# Patient Record
Sex: FEMALE | Race: WHITE | NOT HISPANIC OR LATINO | Employment: FULL TIME | URBAN - METROPOLITAN AREA
[De-identification: names, ages, dates, MRNs, and addresses within clinical notes are randomized per-mention and may not be internally consistent; named-entity substitution may affect disease eponyms.]

---

## 2017-02-21 ENCOUNTER — HOSPITAL ENCOUNTER (OUTPATIENT)
Dept: RADIOLOGY | Facility: HOSPITAL | Age: 42
Discharge: HOME | End: 2017-02-21
Attending: FAMILY MEDICINE
Payer: COMMERCIAL

## 2017-02-21 ENCOUNTER — OFFICE VISIT (OUTPATIENT)
Dept: ORTHOPEDICS | Facility: CLINIC | Age: 42
End: 2017-02-21
Payer: COMMERCIAL

## 2017-02-21 ENCOUNTER — OFFICE VISIT (OUTPATIENT)
Dept: FAMILY MEDICINE | Facility: CLINIC | Age: 42
End: 2017-02-21
Payer: COMMERCIAL

## 2017-02-21 VITALS
HEIGHT: 72 IN | SYSTOLIC BLOOD PRESSURE: 116 MMHG | BODY MASS INDEX: 30.22 KG/M2 | HEART RATE: 64 BPM | WEIGHT: 223.13 LBS | RESPIRATION RATE: 14 BRPM | TEMPERATURE: 98 F | DIASTOLIC BLOOD PRESSURE: 80 MMHG

## 2017-02-21 VITALS
DIASTOLIC BLOOD PRESSURE: 80 MMHG | HEART RATE: 84 BPM | WEIGHT: 223 LBS | SYSTOLIC BLOOD PRESSURE: 116 MMHG | HEIGHT: 72 IN | BODY MASS INDEX: 30.2 KG/M2

## 2017-02-21 DIAGNOSIS — S62.002A CLOSED DISPLACED FRACTURE OF SCAPHOID OF LEFT WRIST, UNSPECIFIED PORTION OF SCAPHOID, INITIAL ENCOUNTER: ICD-10-CM

## 2017-02-21 DIAGNOSIS — S69.92XA LEFT WRIST INJURY, INITIAL ENCOUNTER: Primary | ICD-10-CM

## 2017-02-21 DIAGNOSIS — M25.532 LEFT WRIST PAIN: ICD-10-CM

## 2017-02-21 DIAGNOSIS — M25.532 LEFT WRIST PAIN: Primary | ICD-10-CM

## 2017-02-21 PROCEDURE — 99999 PR PBB SHADOW E&M-EST. PATIENT-LVL III: CPT | Mod: PBBFAC,,, | Performed by: PHYSICIAN ASSISTANT

## 2017-02-21 PROCEDURE — 29085 APPL CAST HAND&LWR FOREARM: CPT | Mod: LT,S$GLB,, | Performed by: ORTHOPAEDIC SURGERY

## 2017-02-21 PROCEDURE — 99999 PR PBB SHADOW E&M-EST. PATIENT-LVL III: CPT | Mod: PBBFAC,,, | Performed by: ORTHOPAEDIC SURGERY

## 2017-02-21 PROCEDURE — 1160F RVW MEDS BY RX/DR IN RCRD: CPT | Mod: S$GLB,,, | Performed by: PHYSICIAN ASSISTANT

## 2017-02-21 PROCEDURE — 73110 X-RAY EXAM OF WRIST: CPT | Mod: 26,LT,, | Performed by: RADIOLOGY

## 2017-02-21 PROCEDURE — 1160F RVW MEDS BY RX/DR IN RCRD: CPT | Mod: S$GLB,,, | Performed by: ORTHOPAEDIC SURGERY

## 2017-02-21 PROCEDURE — 99213 OFFICE O/P EST LOW 20 MIN: CPT | Mod: S$GLB,,, | Performed by: PHYSICIAN ASSISTANT

## 2017-02-21 PROCEDURE — 99203 OFFICE O/P NEW LOW 30 MIN: CPT | Mod: 25,S$GLB,, | Performed by: ORTHOPAEDIC SURGERY

## 2017-02-21 PROCEDURE — 73110 X-RAY EXAM OF WRIST: CPT | Mod: TC,PO,LT

## 2017-02-21 NOTE — LETTER
February 21, 2017      Toshia Dunham, NP  [address]  [facility] [address]           [facility] [address] [facility] [address] [place] [address]  Phone: [phone]          Patient: Naomie Marx   MR Number: [id number]   YOB: 1975   Date of Visit: 2/21/2017       Dear Toshia Dunham:    Thank you for referring Naomie Marx to me for evaluation. Attached you will find relevant portions of my assessment and plan of care.    If you have questions, please do not hesitate to call me. I look forward to following Naomie Marx along with you.    Sincerely,    Derrick Acosta MD    Enclosure  CC:  No Recipients    If you would like to receive this communication electronically, please contact [email or web address] or [phone] to request more information on [facility] Link access.    For providers and/or their staff who would like to refer a patient to [facility], please contact us through our one-stop-shop provider referral line, [facility], at [phone].    If you feel you have received this communication in error or would no longer like to receive these types of communications, please e-mail [email or web address]

## 2017-02-21 NOTE — PROGRESS NOTES
Subjective:       Patient ID: Naomie Marx is a 41 y.o. female.    Chief Complaint: Hand Pain (left hand pain, pt tried to break her fall and injured hand)    HPI   Tenderness and mild edema L wrist and hand after breaking fall  Hyperextension injury L wrist  Review of Systems   Constitutional: Negative.  Negative for activity change, appetite change, chills, diaphoresis, fatigue, fever and unexpected weight change.   HENT: Negative.    Eyes: Negative.    Respiratory: Negative.    Cardiovascular: Negative.    Gastrointestinal: Negative.    Endocrine: Negative.    Genitourinary: Negative.    Musculoskeletal: Positive for arthralgias and joint swelling. Negative for back pain, gait problem, myalgias, neck pain and neck stiffness.   Skin: Negative.  Negative for rash.       Objective:      Physical Exam   Constitutional: She appears well-developed and well-nourished. No distress.   HENT:   Head: Normocephalic and atraumatic.   Eyes: Conjunctivae are normal. No scleral icterus.   Musculoskeletal: She exhibits edema and tenderness. She exhibits no deformity.   Tenderness and mild edema L distal radius and scaphoid/ navicular area  Increased discomfort on motion  Neurovascular intact   Skin: Skin is warm and dry. No rash noted.   Vitals reviewed.      Assessment:       1. Left wrist pain        Plan:       Naomie was seen today for hand pain.    Diagnoses and all orders for this visit:    Left wrist pain  -     X-Ray Wrist Complete Left; Future  -     Ambulatory referral to Orthopedics    pt placed in ace wrap  Discussed otc's

## 2017-02-21 NOTE — PROGRESS NOTES
Subjective:          Chief Complaint: Naomie Marx is a 41 y.o. female who had concerns including Pain of the Left Wrist.  Ortho New Patient Questionnaire 2/21/2017   Which doctor referred you to this clinic?  David Pairsh   Where is the referring doctor located?  [place]   Who is your primary care physician?  [facility]   Where is your primary care provider located?  [facility]   Are you Right handed   Is your visit today due to an injury? Yes   If you were injured, how did the injury happen? fall   What part of the body brings you in for your visit today?  Hand   Which side of the body is your injury/pain?  Left   If you have ever had any steriod injections or taken steriods by mouth, please list the reason.  yes       Hand Injury    The pain is present in the left wrist. This is a new problem. The current episode started yesterday. There has been a history of trauma. The injury was the result of a falling action while at home. The problem occurs constantly. The problem has been unchanged. The quality of the pain is described as throbbing. The pain is at a severity of 8/10. Associated symptoms include a limited range of motion and stiffness. Pertinent negatives include no fever, inability to bear weight, itching, joint locking or tingling. The symptoms are aggravated by activity. She has tried cold for the symptoms. The treatment provided no relief. Physical therapy was not tried.      Past Medical History   Diagnosis Date    Calculus of gallbladder     Migraines     PCOS (polycystic ovarian syndrome)     PONV (postoperative nausea and vomiting)        Past Surgical History   Procedure Laterality Date    Hysterectomy       SHAHANA    Foot surgery Left      9 surgeries for bunion, etc    Oophorectomy      Cholecystectomy  11/19/2015       Family History   Problem Relation Age of Onset    Diabetes Mother     Hypertension Mother     Cancer Mother      liver ca    Hyperlipidemia Mother     Hyperlipidemia  Father     Hypertension Brother     Hypertension Brother     Breast cancer Maternal Grandmother     Cancer Maternal Grandmother 60     breast ca with mets to colon    Ovarian cancer Neg Hx        No current outpatient prescriptions on file.    Review of patient's allergies indicates:   Allergen Reactions    Codeine Hives    Iodine and iodide containing products Other (See Comments)     Chemical burn    Pineapple Other (See Comments)     Oral blisters       Vitals:    02/21/17 1243   BP: 116/80   Pulse: 84         Review of Systems   Constitution: Negative for fever.   HENT: Positive for headaches.    Eyes: Negative for redness.   Cardiovascular: Negative for chest pain and palpitations.   Respiratory: Negative for cough.    Skin: Negative for itching and rash.   Musculoskeletal: Positive for joint pain, joint swelling and stiffness.   Gastrointestinal: Negative for constipation, diarrhea and vomiting.   Genitourinary: Negative for hematuria.   Neurological: Negative for seizures and tingling.   All other systems reviewed and are negative.                  Objective:        General: Naomie is well-developed, well-nourished, appears stated age, in no acute distress, alert and oriented to time, place and person.     General    Vitals reviewed.  Constitutional: She is oriented to person, place, and time. She appears well-developed and well-nourished. No distress.   HENT:   Head: Normocephalic and atraumatic.   Nose: Nose normal.   Eyes: Pupils are equal, round, and reactive to light.   Cardiovascular: Normal rate.    Pulmonary/Chest: Effort normal.   Neurological: She is alert and oriented to person, place, and time.   Psychiatric: She has a normal mood and affect. Her behavior is normal. Thought content normal.             Right Hand/Wrist Exam   Right hand exam is normal.      Left Hand/Wrist Exam     Inspection   Scars: Hand -  absent  Effusion: Wrist - present Hand -  present  Bruising: Hand -   absent    Pain   Hand - The patient exhibits pain of the thumb MCP.  Wrist - The patient exhibits pain of the CMC.    Swelling   Hand - The patient is swollen on the thumb MCP.  Wrist - The patient is swollen on the CMC.    Tenderness   The patient is tender to palpation of the snuff box.     Range of Motion     Wrist   Extension: normal   Flexion: normal   Pronation: normal   Supination: normal     Tests     Atrophy  Thenar:  Negative  Hypothenar:  negative  Intrinsic: negative  1st Dorsal Interosseous:  negative    Other     Sensory Exam  Median Distribution: normal  Ulnar Distribution: normal  Radial Distribution: normal    Comments:  There is pain with ROM of the left wrist and thumb although there is full motion noted.           Vascular Exam       Capillary Refill  Left Hand: normal capillary refill    Current and previous radiographic studies and results were reviewed with the patient:   There is no acute fracture or dislocation.  Note is made that some fractures particularly of the scaphoid may not be apparent initially and if indicated clinically suggest followup study.          Assessment:       Encounter Diagnoses   Name Primary?    Left wrist pain     Left wrist injury, initial encounter Yes    Closed displaced fracture of scaphoid of left wrist, unspecified portion of scaphoid, initial encounter           Plan:         There is not an obvious fracture line, however her JOHNATHAN and physical exam correlates with a possible occult scaphoid injury.  18979 - Joseph Gomez, performed a custom orthotic / brace adjustment, fitting and training with the patient. The patient demonstrated understanding and proper care. This was performed for 15 minutes.  Left thumb spica cast  F/U in two weeks with x-rays out of cast (left wrist with scaphoid views)

## 2017-02-21 NOTE — MR AVS SNAPSHOT
"    [facility]  [address] [facility] [address]  [place] [address]  Phone: [phone]                  Naomie Marx   2017 1:15 PM   Office Visit    Description:  Female : 1975   Provider:  Derrick Acosta MD   Department:  [facility]           Reason for Visit     Left Wrist - Pain           Diagnoses this Visit        Comments    Left wrist pain    -  Primary            To Do List           Future Appointments        Provider Department Dept Phone    3/7/2017 8:45 AM Derrick Acosta MD [facility] [phone]      Goals (5 Years of Data)     None      [facility] On [facility]     [facility] Care Line -  Assistance  Registered nurses in the [facility] On Call Center provide clinical advisement, health education, appointment booking, and other advisory services.  Call for this free service at [phone].             Medications           Message regarding Medications     Verify the changes and/or additions to your medication regime listed below are the same as discussed with your clinician today.  If any of these changes or additions are incorrect, please notify your healthcare provider.             Verify that the below list of medications is an accurate representation of the medications you are currently taking.  If none reported, the list may be blank. If incorrect, please contact your healthcare provider. Carry this list with you in case of emergency.           Current Medications            Clinical Reference Information           Your Vitals Were     BP Pulse Height Weight BMI    116/80 84 6' 2" (1.88 m) 101.2 kg (223 lb) 28.63 kg/m2      Blood Pressure          Most Recent Value    BP  116/80      Allergies as of 2017     Codeine    Iodine And Iodide Containing Products    Pineapple      Immunizations Administered on Date of Encounter - 2017     None      Language Assistance Services     ATTENTION: Language assistance services are " available, free of charge. Please call [phone].      ATENCIÓN: Si habla mitchel, tiene a duffy disposición servicios gratuitos de asistencia lingüística. Llame al [phone].     CHÚ Ý: N?u b?n nói Ti?ng Vi?t, có các d?ch v? h? tr? ngôn ng? mi?n phí dành cho b?n. G?i s? [phone].         [facility] complies with applicable Federal civil rights laws and does not discriminate on the basis of race, color, national origin, age, disability, or sex.

## 2017-03-02 DIAGNOSIS — M25.532 LEFT WRIST PAIN: Primary | ICD-10-CM

## 2017-03-07 ENCOUNTER — OFFICE VISIT (OUTPATIENT)
Dept: ORTHOPEDICS | Facility: CLINIC | Age: 42
End: 2017-03-07
Payer: COMMERCIAL

## 2017-03-07 ENCOUNTER — HOSPITAL ENCOUNTER (OUTPATIENT)
Dept: RADIOLOGY | Facility: HOSPITAL | Age: 42
Discharge: HOME | End: 2017-03-07
Attending: ORTHOPAEDIC SURGERY
Payer: COMMERCIAL

## 2017-03-07 VITALS
DIASTOLIC BLOOD PRESSURE: 83 MMHG | HEIGHT: 72 IN | SYSTOLIC BLOOD PRESSURE: 118 MMHG | WEIGHT: 223 LBS | BODY MASS INDEX: 30.2 KG/M2 | HEART RATE: 69 BPM

## 2017-03-07 DIAGNOSIS — S60.212D CONTUSION OF LEFT WRIST, SUBSEQUENT ENCOUNTER: ICD-10-CM

## 2017-03-07 DIAGNOSIS — M25.532 LEFT WRIST PAIN: ICD-10-CM

## 2017-03-07 DIAGNOSIS — S69.92XD LEFT WRIST INJURY, SUBSEQUENT ENCOUNTER: Primary | ICD-10-CM

## 2017-03-07 PROBLEM — S60.212A CONTUSION OF LEFT WRIST: Status: ACTIVE | Noted: 2017-03-07

## 2017-03-07 PROCEDURE — 73110 X-RAY EXAM OF WRIST: CPT | Mod: TC,PO,LT

## 2017-03-07 PROCEDURE — 73110 X-RAY EXAM OF WRIST: CPT | Mod: 26,LT,, | Performed by: RADIOLOGY

## 2017-03-07 PROCEDURE — 1160F RVW MEDS BY RX/DR IN RCRD: CPT | Mod: S$GLB,,, | Performed by: ORTHOPAEDIC SURGERY

## 2017-03-07 PROCEDURE — 99999 PR PBB SHADOW E&M-EST. PATIENT-LVL III: CPT | Mod: PBBFAC,,, | Performed by: ORTHOPAEDIC SURGERY

## 2017-03-07 PROCEDURE — 97760 ORTHOTIC MGMT&TRAING 1ST ENC: CPT | Mod: S$GLB,,, | Performed by: ORTHOPAEDIC SURGERY

## 2017-03-07 PROCEDURE — 99213 OFFICE O/P EST LOW 20 MIN: CPT | Mod: S$GLB,,, | Performed by: ORTHOPAEDIC SURGERY

## 2017-03-07 NOTE — PROGRESS NOTES
Subjective:          Chief Complaint: Naomie Marx is a 41 y.o. female who had concerns including Injury and Pain of the Left Wrist.  Ortho New Patient Questionnaire 2/21/2017   Which doctor referred you to this clinic?  David Parish   Where is the referring doctor located?  [place]   Who is your primary care physician?  [facility]   Where is your primary care provider located?  [facility]   Are you Right handed   Is your visit today due to an injury? Yes   If you were injured, how did the injury happen? fall   What part of the body brings you in for your visit today?  Hand   Which side of the body is your injury/pain?  Left   If you have ever had any steriod injections or taken steriods by mouth, please list the reason.  yes     She is now having mainly thumb and first webspace pain.   Injury   Associated symptoms include headaches. Pertinent negatives include no chest pain, coughing, fever, joint swelling, rash or vomiting.   Pain   Associated symptoms include headaches. Pertinent negatives include no chest pain, coughing, fever, joint swelling, rash or vomiting.   Hand Injury    The pain is present in the left wrist. This is a new problem. The current episode started yesterday and 1 to 4 weeks ago. There has been a history of trauma. The injury was the result of a falling action while at home. The problem occurs intermittently. The problem has been gradually improving. The quality of the pain is described as throbbing. The pain is at a severity of 5/10. Associated symptoms include a limited range of motion and stiffness. Pertinent negatives include no fever, inability to bear weight, itching, joint locking or tingling. The symptoms are aggravated by activity. She has tried cold for the symptoms. The treatment provided no relief. Physical therapy was not tried.      Past Medical History:   Diagnosis Date    Calculus of gallbladder     Migraines     PCOS (polycystic ovarian syndrome)     PONV (postoperative  nausea and vomiting)        Past Surgical History:   Procedure Laterality Date    CHOLECYSTECTOMY  11/19/2015    FOOT SURGERY Left     9 surgeries for bunion, etc    HYSTERECTOMY      SHAHANA    OOPHORECTOMY         Family History   Problem Relation Age of Onset    Diabetes Mother     Hypertension Mother     Cancer Mother      liver ca    Hyperlipidemia Mother     Hyperlipidemia Father     Hypertension Brother     Hypertension Brother     Breast cancer Maternal Grandmother     Cancer Maternal Grandmother 60     breast ca with mets to colon    Ovarian cancer Neg Hx        No current outpatient prescriptions on file.    Review of patient's allergies indicates:   Allergen Reactions    Codeine Hives    Iodine and iodide containing products Other (See Comments)     Chemical burn    Pineapple Other (See Comments)     Oral blisters       Vitals:    03/07/17 0838   BP: 118/83   Pulse: 69         Review of Systems   Constitution: Negative for fever.   HENT: Positive for headaches.    Eyes: Negative for redness.   Cardiovascular: Negative for chest pain and palpitations.   Respiratory: Negative for cough.    Skin: Negative for itching and rash.   Musculoskeletal: Positive for joint pain and stiffness. Negative for joint swelling.   Gastrointestinal: Negative for constipation, diarrhea and vomiting.   Genitourinary: Negative for hematuria.   Neurological: Negative for seizures and tingling.   All other systems reviewed and are negative.                  Objective:        General: Naomie is well-developed, well-nourished, appears stated age, in no acute distress, alert and oriented to time, place and person.     General    Vitals reviewed.  Constitutional: She is oriented to person, place, and time. She appears well-developed and well-nourished. No distress.   HENT:   Head: Normocephalic and atraumatic.   Nose: Nose normal.   Eyes: Pupils are equal, round, and reactive to light.   Cardiovascular: Normal rate.     Pulmonary/Chest: Effort normal.   Neurological: She is alert and oriented to person, place, and time.   Psychiatric: She has a normal mood and affect. Her behavior is normal. Thought content normal.             Right Hand/Wrist Exam   Right hand exam is normal.      Left Hand/Wrist Exam     Inspection   Scars: Hand -  absent  Effusion: Wrist - present Hand -  absent  Bruising: Hand -  absent    Pain   Hand - The patient exhibits pain of the thumb MCP.    Swelling   Hand - The patient is swollen on the thumb MCP.    Range of Motion     Wrist   Extension: normal   Flexion: normal   Pronation: normal   Supination: normal     Tests     Atrophy  Thenar:  Negative  Hypothenar:  negative  Intrinsic: negative  1st Dorsal Interosseous:  negative    Other     Sensory Exam  Median Distribution: normal  Ulnar Distribution: normal  Radial Distribution: normal    Comments:  There is no pain with ROM of the left wrist and thumb full motion noted. No scaphoid TTP noted on exam today          Vascular Exam       Capillary Refill  Left Hand: normal capillary refill    Current and previous radiographic studies and results were reviewed with the patient:   There is no acute fracture or dislocation.  Note is made that some fractures particularly of the scaphoid may not be apparent initially and if indicated clinically suggest followup study.    Today's Scaphoid Films:  Findings:    No fracture, dislocation, or osseous destructive process.  No radiographically evident osteonecrosis.  No soft tissue abnormality or erosions.  No chondrocalcinosis.   Impression    No significant abnormalities appreciated radiographically.           Assessment:       Encounter Diagnoses   Name Primary?    Left wrist injury, subsequent encounter Yes    Contusion of left wrist, subsequent encounter     Left wrist pain           Plan:         There is not an obvious fracture line  And TTP  Has decreased in snuffbox; scaphoid fracture is unlikely  77962 -  Mariah ace I, performed a custom orthotic / brace adjustment, fitting and training with the patient. The patient demonstrated understanding and proper care. This was performed for 15 minutes.  Left thumb spica   F/U in two weeks for function check

## 2017-03-21 ENCOUNTER — OFFICE VISIT (OUTPATIENT)
Dept: ORTHOPEDICS | Facility: CLINIC | Age: 42
End: 2017-03-21
Payer: COMMERCIAL

## 2017-03-21 ENCOUNTER — HOSPITAL ENCOUNTER (OUTPATIENT)
Dept: RADIOLOGY | Facility: HOSPITAL | Age: 42
Discharge: HOME | End: 2017-03-21
Attending: ORTHOPAEDIC SURGERY
Payer: COMMERCIAL

## 2017-03-21 VITALS
WEIGHT: 223 LBS | DIASTOLIC BLOOD PRESSURE: 76 MMHG | HEIGHT: 72 IN | HEART RATE: 61 BPM | BODY MASS INDEX: 30.2 KG/M2 | SYSTOLIC BLOOD PRESSURE: 118 MMHG

## 2017-03-21 DIAGNOSIS — S60.212D CONTUSION OF LEFT WRIST, SUBSEQUENT ENCOUNTER: ICD-10-CM

## 2017-03-21 DIAGNOSIS — M25.532 LEFT WRIST PAIN: ICD-10-CM

## 2017-03-21 DIAGNOSIS — S69.92XD LEFT WRIST INJURY, SUBSEQUENT ENCOUNTER: Primary | ICD-10-CM

## 2017-03-21 DIAGNOSIS — S69.92XD LEFT WRIST INJURY, SUBSEQUENT ENCOUNTER: ICD-10-CM

## 2017-03-21 PROCEDURE — 99213 OFFICE O/P EST LOW 20 MIN: CPT | Mod: S$GLB,,, | Performed by: ORTHOPAEDIC SURGERY

## 2017-03-21 PROCEDURE — 73110 X-RAY EXAM OF WRIST: CPT | Mod: TC,PO,LT

## 2017-03-21 PROCEDURE — 73110 X-RAY EXAM OF WRIST: CPT | Mod: 26,LT,, | Performed by: RADIOLOGY

## 2017-03-21 PROCEDURE — 1160F RVW MEDS BY RX/DR IN RCRD: CPT | Mod: S$GLB,,, | Performed by: ORTHOPAEDIC SURGERY

## 2017-03-21 PROCEDURE — 99999 PR PBB SHADOW E&M-EST. PATIENT-LVL III: CPT | Mod: PBBFAC,,, | Performed by: ORTHOPAEDIC SURGERY

## 2017-03-21 NOTE — PROGRESS NOTES
Subjective:          Chief Complaint: Naomie Marx is a 41 y.o. female who had concerns including Wrist Pain.  Ortho New Patient Questionnaire 2/21/2017   Which doctor referred you to this clinic?  David Parish   Where is the referring doctor located?  [place]   Who is your primary care physician?  [facility]   Where is your primary care provider located?  [facility]   Are you Right handed   Is your visit today due to an injury? Yes   If you were injured, how did the injury happen? fall   What part of the body brings you in for your visit today?  Hand   Which side of the body is your injury/pain?  Left   If you have ever had any steriod injections or taken steriods by mouth, please list the reason.  yes     She is now having mainly thumb and first webspace pain. She has been in a thumb spica brace since her last visit and increasing her activities. The wrist/thumb feel good in the brace with increased pain out of the brace.    Wrist Pain    Associated symptoms include a limited range of motion and stiffness. Pertinent negatives include no fever, inability to bear weight, itching, joint locking or tingling.   Injury   Associated symptoms include headaches. Pertinent negatives include no chest pain, coughing, fever, joint swelling, rash or vomiting.   Pain   Associated symptoms include headaches. Pertinent negatives include no chest pain, coughing, fever, joint swelling, rash or vomiting.   Hand Injury    The pain is present in the left wrist. This is a new problem. The current episode started yesterday and 1 to 4 weeks ago. There has been a history of trauma. The injury was the result of a falling action while at home. The problem occurs intermittently. The problem has been gradually improving. The quality of the pain is described as throbbing. The pain is at a severity of 5/10. Associated symptoms include a limited range of motion and stiffness. Pertinent negatives include no fever, inability to bear weight,  itching, joint locking or tingling. The symptoms are aggravated by activity. She has tried cold for the symptoms. The treatment provided no relief. Physical therapy was not tried.      Past Medical History:   Diagnosis Date    Calculus of gallbladder     Migraines     PCOS (polycystic ovarian syndrome)     PONV (postoperative nausea and vomiting)        Past Surgical History:   Procedure Laterality Date    CHOLECYSTECTOMY  11/19/2015    FOOT SURGERY Left     9 surgeries for bunion, etc    HYSTERECTOMY      SHAHANA    OOPHORECTOMY         Family History   Problem Relation Age of Onset    Diabetes Mother     Hypertension Mother     Cancer Mother      liver ca    Hyperlipidemia Mother     Hyperlipidemia Father     Hypertension Brother     Hypertension Brother     Breast cancer Maternal Grandmother     Cancer Maternal Grandmother 60     breast ca with mets to colon    Ovarian cancer Neg Hx        No current outpatient prescriptions on file.    Review of patient's allergies indicates:   Allergen Reactions    Codeine Hives    Iodine and iodide containing products Other (See Comments)     Chemical burn    Pineapple Other (See Comments)     Oral blisters       Vitals:    03/21/17 0842   BP: 118/76   Pulse: 61         Review of Systems   Constitution: Negative for fever.   HENT: Positive for headaches.    Eyes: Negative for redness.   Cardiovascular: Negative for chest pain and palpitations.   Respiratory: Negative for cough.    Skin: Negative for itching and rash.   Musculoskeletal: Positive for joint pain and stiffness. Negative for joint swelling.   Gastrointestinal: Negative for constipation, diarrhea and vomiting.   Genitourinary: Negative for hematuria.   Neurological: Negative for seizures and tingling.   All other systems reviewed and are negative.                  Objective:        General: Naomie is well-developed, well-nourished, appears stated age, in no acute distress, alert and oriented to  time, place and person.     General    Vitals reviewed.  Constitutional: She is oriented to person, place, and time. She appears well-developed and well-nourished. No distress.   HENT:   Head: Normocephalic and atraumatic.   Nose: Nose normal.   Eyes: Pupils are equal, round, and reactive to light.   Cardiovascular: Normal rate.    Pulmonary/Chest: Effort normal.   Neurological: She is alert and oriented to person, place, and time.   Psychiatric: She has a normal mood and affect. Her behavior is normal. Judgment and thought content normal.         Left Hand/Wrist Exam     Inspection   Scars: Hand -  absent  Effusion: Wrist - present Hand -  absent  Bruising: Hand -  absent    Pain   Hand - The patient exhibits pain of the thumb MCP.  Wrist - The patient exhibits pain of the CMC.    Swelling   Hand - The patient is swollen on the thumb MCP.    Tenderness   The patient is tender to palpation of the radial area.     Range of Motion     Wrist   Extension: normal   Flexion: normal   Pronation: normal   Supination: normal     Tests     Atrophy  Thenar:  Negative  Hypothenar:  negative  Intrinsic: negative  1st Dorsal Interosseous:  negative    Other     Sensory Exam  Median Distribution: normal  Ulnar Distribution: normal  Radial Distribution: normal    Comments:  There is pain with ROM of the left wrist and thumb full motion noted. ++ scaphoid TTP noted on exam today          Vascular Exam       Capillary Refill  Left Hand: normal capillary refill    Current and previous radiographic studies and results were reviewed with the patient:   Possible fracture line evaluated on scaphoid views; mid-pole        Assessment:       Encounter Diagnoses   Name Primary?    Contusion of left wrist, subsequent encounter Yes    Left wrist injury, subsequent encounter     Left wrist pain           Plan:           Continue with Left thumb spica   Will MRI the left wrist to rule out occult scaphoid fracture  F/U after MRI

## 2017-03-22 ENCOUNTER — HOSPITAL ENCOUNTER (OUTPATIENT)
Dept: RADIOLOGY | Facility: HOSPITAL | Age: 42
Discharge: HOME | End: 2017-03-22
Attending: ORTHOPAEDIC SURGERY
Payer: COMMERCIAL

## 2017-03-22 DIAGNOSIS — S60.212D CONTUSION OF LEFT WRIST, SUBSEQUENT ENCOUNTER: ICD-10-CM

## 2017-03-22 DIAGNOSIS — M25.532 LEFT WRIST PAIN: ICD-10-CM

## 2017-03-22 DIAGNOSIS — S69.92XD LEFT WRIST INJURY, SUBSEQUENT ENCOUNTER: ICD-10-CM

## 2017-03-22 PROCEDURE — 73221 MRI JOINT UPR EXTREM W/O DYE: CPT | Mod: 26,LT,, | Performed by: RADIOLOGY

## 2017-03-22 PROCEDURE — 73221 MRI JOINT UPR EXTREM W/O DYE: CPT | Mod: TC,LT

## 2017-03-24 ENCOUNTER — OFFICE VISIT (OUTPATIENT)
Dept: ORTHOPEDICS | Facility: CLINIC | Age: 42
End: 2017-03-24
Payer: COMMERCIAL

## 2017-03-24 VITALS
DIASTOLIC BLOOD PRESSURE: 72 MMHG | HEIGHT: 72 IN | WEIGHT: 223 LBS | HEART RATE: 77 BPM | SYSTOLIC BLOOD PRESSURE: 114 MMHG | BODY MASS INDEX: 30.2 KG/M2

## 2017-03-24 DIAGNOSIS — M25.532 LEFT WRIST PAIN: ICD-10-CM

## 2017-03-24 DIAGNOSIS — S60.212D CONTUSION OF LEFT WRIST, SUBSEQUENT ENCOUNTER: Primary | ICD-10-CM

## 2017-03-24 DIAGNOSIS — S69.92XD LEFT WRIST INJURY, SUBSEQUENT ENCOUNTER: ICD-10-CM

## 2017-03-24 PROCEDURE — 99213 OFFICE O/P EST LOW 20 MIN: CPT | Mod: S$GLB,,, | Performed by: ORTHOPAEDIC SURGERY

## 2017-03-24 PROCEDURE — 1160F RVW MEDS BY RX/DR IN RCRD: CPT | Mod: S$GLB,,, | Performed by: ORTHOPAEDIC SURGERY

## 2017-03-24 PROCEDURE — 99999 PR PBB SHADOW E&M-EST. PATIENT-LVL III: CPT | Mod: PBBFAC,,, | Performed by: ORTHOPAEDIC SURGERY

## 2017-03-24 NOTE — PROGRESS NOTES
Subjective:          Chief Complaint: Naomie Marx is a 41 y.o. female who had concerns including Follow-up of the Left Wrist.  Ortho New Patient Questionnaire 2/21/2017   Which doctor referred you to this clinic?  David [facility]   Where is the referring doctor located?  [place]   Who is your primary care physician?  [facility]   Where is your primary care provider located?  [place]   Are you Right handed   Is your visit today due to an injury? Yes   If you were injured, how did the injury happen? fall   What part of the body brings you in for your visit today?  Hand   Which side of the body is your injury/pain?  Left   If you have ever had any steriod injections or taken steriods by mouth, please list the reason.  yes     She is now having mainly thumb and first webspace pain. She has been in a thumb spica brace since her last visit and increasing her activities. The wrist/thumb feel good in the brace with increased pain out of the brace. She is here for MRI f/u.    Wrist Pain    Associated symptoms include a limited range of motion and stiffness. Pertinent negatives include no fever, inability to bear weight, itching, joint locking or tingling.   Injury   Associated symptoms include headaches. Pertinent negatives include no chest pain, coughing, fever, joint swelling, rash or vomiting.   Pain   Associated symptoms include headaches. Pertinent negatives include no chest pain, coughing, fever, joint swelling, rash or vomiting.   Hand Injury    The pain is present in the left wrist. The quality of the pain is described as throbbing. The pain is at a severity of 5/10. Pertinent negatives include no chest pain or tingling. The symptoms are aggravated by activity. She has tried cold for the symptoms. The treatment provided no relief.       Past Medical History:   Diagnosis Date    Calculus of gallbladder     Migraines     PCOS (polycystic ovarian syndrome)     PONV (postoperative nausea and vomiting)        Past  Surgical History:   Procedure Laterality Date    CHOLECYSTECTOMY  11/19/2015    FOOT SURGERY Left     9 surgeries for bunion, etc    HYSTERECTOMY      SHAHANA    OOPHORECTOMY         Family History   Problem Relation Age of Onset    Diabetes Mother     Hypertension Mother     Cancer Mother      liver ca    Hyperlipidemia Mother     Hyperlipidemia Father     Hypertension Brother     Hypertension Brother     Breast cancer Maternal Grandmother     Cancer Maternal Grandmother 60     breast ca with mets to colon    Ovarian cancer Neg Hx        No current outpatient prescriptions on file.    Review of patient's allergies indicates:   Allergen Reactions    Codeine Hives    Iodine and iodide containing products Other (See Comments)     Chemical burn    Pineapple Other (See Comments)     Oral blisters       Vitals:    03/24/17 0807   BP: 114/72   Pulse: 77         Review of Systems   Constitution: Negative for fever.   HENT: Positive for headaches.    Eyes: Negative for redness.   Cardiovascular: Negative for chest pain and palpitations.   Respiratory: Negative for cough.    Skin: Negative for itching and rash.   Musculoskeletal: Positive for joint pain and stiffness. Negative for joint swelling.   Gastrointestinal: Negative for constipation, diarrhea and vomiting.   Genitourinary: Negative for hematuria.   Neurological: Negative for seizures and tingling.   All other systems reviewed and are negative.                  Objective:        General: Naomie is well-developed, well-nourished, appears stated age, in no acute distress, alert and oriented to time, place and person.     General    Vitals reviewed.  Constitutional: She is oriented to person, place, and time. She appears well-developed and well-nourished. No distress.   HENT:   Head: Normocephalic and atraumatic.   Nose: Nose normal.   Eyes: Pupils are equal, round, and reactive to light.   Cardiovascular: Normal rate.    Pulmonary/Chest: Effort normal.    Neurological: She is alert and oriented to person, place, and time.   Psychiatric: She has a normal mood and affect. Her behavior is normal. Judgment and thought content normal.         Left Hand/Wrist Exam     Inspection   Scars: Hand -  absent  Effusion: Wrist - present Hand -  absent  Bruising: Hand -  absent    Pain   Hand - The patient exhibits pain of the thumb MCP.  Wrist - The patient exhibits pain of the CMC.    Swelling   Hand - The patient is swollen on the thumb MCP.    Tenderness   The patient is tender to palpation of the radial area.     Range of Motion     Wrist   Extension: normal   Flexion: normal   Pronation: normal   Supination: normal     Tests     Atrophy  Thenar:  Negative  Hypothenar:  negative  Intrinsic: negative  1st Dorsal Interosseous:  negative    Other     Sensory Exam  Median Distribution: normal  Ulnar Distribution: normal  Radial Distribution: normal    Comments:  There is pain with ROM of the left wrist and thumb full motion noted. ++ scaphoid TTP noted on exam today          Vascular Exam       Capillary Refill  Left Hand: normal capillary refill    Current and previous radiographic studies and results were reviewed with the patient:   Possible fracture line evaluated on scaphoid views; mid-pole    MRI:  Findings: No abnormal linear T1 signal or marrow edema is identified within the scaphoid to suggest a recent fracture. There is marrow edema involving the dorsal aspect of the capitate, hamate and involving the bases of the third and fourth metacarpals. There is a small osseous fragment with associated T1 hypointense fracture line along the dorsal base of the third metacarpal best appreciated on sagittal sequence 8, image 9. Curvilinear signal within the trapezium is thought to be artifactual. Possible T1 hypointense fracture line involving the capitate on sequence 3, image 8. No definitive fracture line involving the fourth base or hamate is identified.     There is a small,  septated T2 hyperintense cyst noted along the volar aspect of the distal radius measuring approximately 1.5 x 0.7 cm in transaxial dimensions on sequence 6, image 21. The carpal tunnel is unremarkable. The triangular fibrocartilage complex is unremarkable. No evidence for tenosynovitis. The intrinsic ligaments of the wrist a appear intact. No scapholunate distance widening. The median and ulnar nerves are unremarkable.   Impression     1.  No evidence for scaphoid fracture.    2. Abnormal marrow edema involving the left capitate, hamate as well as the bases of the third and fourth metacarpals along the dorsal aspects. There is a nondisplaced fracture involving the dorsal base of the third metacarpal and a possible fracture line involving the capitate. These may have been previously radiographically occult. No appreciable fracture line within the hamate or base of the fourth metacarpal identified.    3. Small, septated ganglion cyst along the volar aspect of the distal radius.               Assessment:       Encounter Diagnoses   Name Primary?    Contusion of left wrist, subsequent encounter Yes    Left wrist pain     Left wrist injury, subsequent encounter           Plan:         Occupational Therapy  F/U in 4 weeks

## 2017-04-05 ENCOUNTER — CLINICAL SUPPORT (OUTPATIENT)
Dept: REHABILITATION | Facility: HOSPITAL | Age: 42
End: 2017-04-05
Attending: ORTHOPAEDIC SURGERY
Payer: COMMERCIAL

## 2017-04-05 DIAGNOSIS — M25.532 LEFT WRIST PAIN: Primary | ICD-10-CM

## 2017-04-05 DIAGNOSIS — R29.898 DECREASED GRIP STRENGTH OF LEFT HAND: ICD-10-CM

## 2017-04-05 DIAGNOSIS — M25.632 WRIST STIFFNESS, LEFT: ICD-10-CM

## 2017-04-05 DIAGNOSIS — R68.89 IMPAIRED FUNCTION OF UPPER EXTREMITY: ICD-10-CM

## 2017-04-05 PROCEDURE — 97110 THERAPEUTIC EXERCISES: CPT | Mod: PN

## 2017-04-05 PROCEDURE — 97165 OT EVAL LOW COMPLEX 30 MIN: CPT | Mod: PN

## 2017-04-07 ENCOUNTER — CLINICAL SUPPORT (OUTPATIENT)
Dept: REHABILITATION | Facility: HOSPITAL | Age: 42
End: 2017-04-07
Attending: ORTHOPAEDIC SURGERY
Payer: COMMERCIAL

## 2017-04-07 DIAGNOSIS — R29.898 DECREASED GRIP STRENGTH OF LEFT HAND: ICD-10-CM

## 2017-04-07 DIAGNOSIS — M25.632 WRIST STIFFNESS, LEFT: ICD-10-CM

## 2017-04-07 DIAGNOSIS — R68.89 IMPAIRED FUNCTION OF UPPER EXTREMITY: ICD-10-CM

## 2017-04-07 DIAGNOSIS — M25.532 LEFT WRIST PAIN: Primary | ICD-10-CM

## 2017-04-07 PROCEDURE — 97110 THERAPEUTIC EXERCISES: CPT | Mod: PN

## 2017-04-07 NOTE — PROGRESS NOTES
"Occupational Therapy    Date:  04/07/2017  Start Time:  1610  Stop Time:  1700    TIMED  Procedure Time Min.   TherEx (3) Start:  Stop: 45   Total Timed Minutes:  45  Total Timed Units:  3  Total Untimed Units:  0  Charges Billed/# of units:  3    Progress/Current Status    Subjective:     Patient ID: Naomie Marx is a 41 y.o. female.  Diagnosis:   1. Left wrist pain     2. Impaired function of upper extremity     3. Decreased  strength of left hand     4. Wrist stiffness, left       Pain: 4 /10 pre tx, 5/10 post tx  "It's hurting today."  Pt reports that she splits stretches into chunks and alternates them to limit pain.    Objective:     Pt has trimmed KT, but otherwise still in good shape and over area of injury. Pt instructed to continue trimming and leave in place.  Prayer stretch x3, stretch to wrist flexion x3, Prayer stretch x3, stretch to wrist flexion x3, Prayer stretch x4, stretch to wrist flexion x4 - all held for 30s (as this is how pt is doing it at home)  Stretch to wrist flexion w/ fist, 10x15s - pt instructed to hold for 30s at home.  Manual tx for gentle distraction at wrist.  Stretch to RD, 10x15s  - pt instructed to hold for 30s at home.  Stretch to UD, 10x15s  - pt instructed to hold for 30s at home.    Assessment:     Pt tolerated brief distraction well considering that touch was painful on Wednesday.    Patient Education/Response:     Updated HEP - prayer stretch, stretch to wrist flex w/ fist, RD and UD - new handout given.    Plans and Goals:     Review updated HEP. Progress as tolerated.    PETRA Lemon, SIOBHAN  4/7/2017      "

## 2017-04-07 NOTE — PROGRESS NOTES
Occupational Therapy   Evaluation    Naomie Marx   MRN: [id number]     OT eval complete. Please see attached POC for details. Thank you for consult!    PETRA Lemon LOTR  4/5/2017

## 2017-04-07 NOTE — PLAN OF CARE
Occupational Therapy   Evaluation    Naomie Marx   MRN: [id number]     Referring Physician: Dr. Derrick Acosta    Date: 4/5/2017  Start Time:  1615  Stop Time:  1715    TIMED  Procedure Time Min.   TherEx (1) Start:  Stop: 20         UNTIMED  Procedure Time Min.   Eval - low complexity Start:  Stop: 40     Total Timed Minutes:  20  Total Timed Units:  1  Total Untimed Units:  1  Charges Billed/# of units:  2    Past Medical History:   Diagnosis Date    Calculus of gallbladder     Migraines     PCOS (polycystic ovarian syndrome)     PONV (postoperative nausea and vomiting)      No current outpatient prescriptions on file.     No current facility-administered medications for this visit.      Review of patient's allergies indicates:   Allergen Reactions    Codeine Hives    Iodine and iodide containing products Other (See Comments)     Chemical burn    Pineapple Other (See Comments)     Oral blisters       Diagnosis: L wrist pain; s/p fx dorsal base 3rd metacarpal and possible fx of capitate - eval is 6 weeks 2 days after injury  Encounter Diagnoses   Name Primary?    Left wrist pain Yes    Impaired function of upper extremity     Decreased  strength of left hand     Wrist stiffness, left        Referral Orders: Eval and treat    Age: 41 y.o.  Sex: female  Hand dominance: right    History of Current Situation: Pt tripped on driveway and tried to avoid stepping on dog, resulting in FOOSH. F/U w/ ortho day after injury.  Date of Injury: 2/20/17  Date of Surgery: n/a  Involved areas: left hand    X-Ray Results: from 3/21/17 Suggestion of linear lucency traversing the scaphoid waist without definitive cortical break through.  If there is a clinical concern of radiographically occult scaphoid fracture, consideration could be given to performing MRI of the left wrist.    MRI Results:  from 3/22/17: No evidence for scaphoid fracture; (+) for Abnormal marrow edema involving the left capitate, hamate as well as  the bases of the third and fourth metacarpals along the dorsal aspects. There is a nondisplaced fracture involving the dorsal base of the third metacarpal and a possible fracture line involving the capitate. No appreciable fracture line within the hamate or base of the fourth metacarpal identified.    Subjective  Naomie reports the wrist is improving, though she still has pain and difficulty with function.    Occupation:   Working presently: Yes  Last time worked: today  Workmen's Compensation: No    Functional Pain Scale Rating 0-10: 0/10 at present, 5/10 on Monday (pt thinks that weather affected her pain) at the back of the hand, base of 3rd finger and over wrist.  Lifting and using the L UE when driving increase her pain.  Wearing her brace and resting decrease her pain.    Naomie's goals for therapy are: Decrease pain, Improve strenght and Improve functional hand use      Objective    Observation: Mild edema over dorsum of hand, along with old, healing bruising. Dorsum of hand is also tender to touch.    Sensation: light touch grossly intact     Range of Motion: Pt has a full fist with landen UE, as well as full thumb movement.     Pronation: Landen WFL  Supination: R WFL, L WFL with pain  Wrist Ext: R=72a, L=45a, 63p  Wrist Flex: R=85a, L=56a, 59p  Radial Deviation: R=20a, L=6a  Ulnar Deviation: R=45a, L=20a  Elbow extension/flexion: Landen UE WFL     Strength: (LORI Dynamometer in lbs.) Average 3 trials, Position II  Right: 67.67#   Left: 21.67#    UEFI (The Upper Extremity Functional Index): 43/80 (46% functional impairment)     Functional Limitations: Patient presents with the following functional Limitations:     Work/Activities: lifting, carrying, cooking, cleaning    Leisure: walking dogs, cooking    Treatment included: OT evaluation and instruction in written HEP including (Hand Therapy/Finger Exercises) stretching to wrist flexion and prayer stretch, 10x30s each. Perf'd in clinic.  HEP for 3x/day. Handout given. Fan cut KT tape x2 applied in overlapping fashion w/ oscillations from elbow down over dorsum of hand to decrease edema. Pt ed re: KT wear/care and handout given.    Assessment  Naomie presented with a low complexity OT evaluation requiring brief review of medical history and occupational profile. Pt w/ deficits including decreased IADL, work and leisure performance, decreased ROM and strength and increased pain. Pt required low analytical complexity due to occupational profile factors. Assessment required problem-focused review. Pt has no comorbidities affecting occupational performance. No modifications were necessary for assessment completion. Naomie can benefit from skilled OT to address her performance deficits to improve L UE function for increased participation in desired activities and return to previous roles of , dog owner,  and full independent living.    Treatment Diagnosis/ Problem List LUE Decreased ROM, Decreased  strength, Decreased muscle strength, Decreased functional hand use, Increased pain and Joint Stiffness    Short Term Goals: 4 weeks (4/5/17-5/3/17)  1) Patient to be IND with HEP and modalities for pain/edema managment.  2) Increase wrist ROM (flex and ext) 10-15 degrees to increase functional hand use for ADLs/work/leisure activities.  3) Decrease complaints of pain to consistently  2 out of 10 to increase functional hand use for ADL/work/leisure activities.    Long Term Goals: 8 weeks (4/5/17-5/31/17)  1) Pt will be able to lift a case of water pain less than 2/10.  2) Pt will be able to type without her brace and no pain.  3) Pt will be able to walk the smaller dog with the left hand.   4) Pt will demo L wrist PROM WFL.  5) Pt will demo L  at least 40#.  6) Pt will demo improved function in the L UE as evidenced by a UEFI score of greater than 60.    Plan  Naomie Marx to be treated by Occupational Therapy 2 times per  week for 8 weeks during the certification period from 4/5/17 to 5/31/17 to achieve the established goals.     Treatment to include: Paraffin, Fluidotherapy, Manual therapy/joint mobilizations, Modalities for pain management, US 3 mhz, Therapeutic exercises/activities., Strengthening, Edema Control, Joint Protection, Energy Conservation, HEP, pt/family education, as well as any other treatments deemed necessary based on the patient's needs or progress.     PETRA Lemon, LOTR  4/5/2017    I certify the need for these services furnished under this plan of treatment and while under my care.     ____________________________________                         __________________  Physician/Referring Practitioner                                               Date of Signature

## 2017-04-11 ENCOUNTER — CLINICAL SUPPORT (OUTPATIENT)
Dept: REHABILITATION | Facility: HOSPITAL | Age: 42
End: 2017-04-11
Attending: ORTHOPAEDIC SURGERY
Payer: COMMERCIAL

## 2017-04-11 DIAGNOSIS — M25.632 STIFFNESS OF LEFT WRIST JOINT: ICD-10-CM

## 2017-04-11 DIAGNOSIS — M25.632 WRIST STIFFNESS, LEFT: ICD-10-CM

## 2017-04-11 DIAGNOSIS — M25.532 LEFT WRIST PAIN: Primary | ICD-10-CM

## 2017-04-11 PROCEDURE — 97110 THERAPEUTIC EXERCISES: CPT | Mod: PN

## 2017-04-11 PROCEDURE — 97022 WHIRLPOOL THERAPY: CPT | Mod: PN

## 2017-04-11 NOTE — PROGRESS NOTES
Time in 2  Time out 3    untimed units    fluido                               Time:2-220    Timed units   therex                             Time:220-3      S: understands current HEP issued today; says ortho told her OT would advise pt. About splint use  Pain:diffuse ache wrist dorsum and ulna distal forearm ache; rest 0/10, use 4, sleep 0    O:  Prom                              r                           l  Sup/pro                     90/90                      90(+) with overpressure/90  Df/pf                          80/90                      50/90  Rd/ud                        30/50                      30/50    Mild extrinsic extensor tightness noted    Diffuse edema thru dorsal wrist    Issued current HEP: splint when out in public, heating pad x 15' pre stretches, light painless nonrepetitive use only, take frequent breaks with typing, d/c kinesiotape    Stretches as tolerated-pain free: prayer, fist forward    A: wrist df hypomobility and likely ecu tenosynovitis            P:fix wrist stiffness; treat ecu tenosynovitis like s/s prn in future via tfm, pulsed u/s, and avoidance of provocative tasks

## 2017-04-13 ENCOUNTER — CLINICAL SUPPORT (OUTPATIENT)
Dept: REHABILITATION | Facility: HOSPITAL | Age: 42
End: 2017-04-13
Attending: ORTHOPAEDIC SURGERY
Payer: COMMERCIAL

## 2017-04-13 DIAGNOSIS — R29.898 DECREASED GRIP STRENGTH OF LEFT HAND: ICD-10-CM

## 2017-04-13 DIAGNOSIS — M25.632 STIFFNESS OF LEFT WRIST JOINT: ICD-10-CM

## 2017-04-13 DIAGNOSIS — M25.532 LEFT WRIST PAIN: Primary | ICD-10-CM

## 2017-04-13 DIAGNOSIS — M25.632 WRIST STIFFNESS, LEFT: ICD-10-CM

## 2017-04-13 PROCEDURE — 97035 APP MDLTY 1+ULTRASOUND EA 15: CPT | Mod: PN

## 2017-04-13 PROCEDURE — 97140 MANUAL THERAPY 1/> REGIONS: CPT | Mod: PN

## 2017-04-13 PROCEDURE — 97022 WHIRLPOOL THERAPY: CPT | Mod: PN

## 2017-04-13 PROCEDURE — 97110 THERAPEUTIC EXERCISES: CPT | Mod: PN

## 2017-04-13 NOTE — PROGRESS NOTES
Time in 4  Time out 430    untimed units    fluido                               Time:4-420  u/s                               Time:420-430    Timed units  1 manual                              Time:430-445  1 therex                              Time:445-5      S: no new issues, doing HEP, understands rationale of current care and probable tx progression  Pain:continues to decrease in intensity and frequency    O:  fluido  Pulsed u/s 10' 0.5 w/cm squared 3 mhz to ecu sheath  Traction MLPP  Traction wrist ext, ext ud  Prayer stretches as tolerated-pain free          A: may benefit from rc and/or midcarpal glides to improve wrist df, focus of rehab should be to restore full flexibility of wrist and tx any soft tissue pain generators prn            P:pulsed u/s, tfm for ecu tendon sheath; fix wrist tightness

## 2017-04-20 ENCOUNTER — CLINICAL SUPPORT (OUTPATIENT)
Dept: REHABILITATION | Facility: HOSPITAL | Age: 42
End: 2017-04-20
Attending: ORTHOPAEDIC SURGERY
Payer: COMMERCIAL

## 2017-04-20 DIAGNOSIS — M25.532 LEFT WRIST PAIN: Primary | ICD-10-CM

## 2017-04-20 DIAGNOSIS — R29.898 DECREASED GRIP STRENGTH OF LEFT HAND: ICD-10-CM

## 2017-04-20 DIAGNOSIS — M25.632 WRIST STIFFNESS, LEFT: ICD-10-CM

## 2017-04-20 DIAGNOSIS — M25.632 STIFFNESS OF LEFT WRIST JOINT: ICD-10-CM

## 2017-04-20 PROCEDURE — 97140 MANUAL THERAPY 1/> REGIONS: CPT | Mod: PN

## 2017-04-20 PROCEDURE — 97022 WHIRLPOOL THERAPY: CPT | Mod: PN

## 2017-04-20 NOTE — PROGRESS NOTES
Time in 2  Time out 230    untimed units    fluido                             Time:2-215      Timed units  manual                              Time:215-230        S: doing all required tasks  Pain:max decrease in intensity and frequency since day 1 of OT    O:  full a/prom forearm thru hand vs r side             II             III  r        65#          75  l        60            60  fluido  tfm to ecu sheath x 10'  Issued current HEP: wean stretches, heating pad x 15' for 7 days to heal tendon pain, tfm ecu sheath, slowly progress use as tolerated-pain free  Explained basics of soft tissue healing and how d/c HEP should help resolve pain long term    Overpressure at sup end range (+), pain with palpation at distal ecu sheath    Resisted wrist df with ud bias (-)      A: formal rehab no longer indicated            P: d/c